# Patient Record
(demographics unavailable — no encounter records)

---

## 2025-02-05 NOTE — REVIEW OF SYSTEMS
[Fever] : fever [Earache] : no earache [Chest Pain] : no chest pain [Shortness Of Breath] : no shortness of breath [Hematuria] : no hematuria [Joint Pain] : no joint pain [Headache] : no headache

## 2025-02-05 NOTE — HISTORY OF PRESENT ILLNESS
[Cold Symptoms] : cold symptoms [Earache (L)] : pain in left ear [Earache (R)] : pain in right ear [Moderate] : moderate [___ Days ago] : [unfilled] days ago [Sudden] : suddenly [Constant] : constant [Congestion] : congestion [Cough] : cough [Chills] : chills [Anorexia] : anorexia [Earache] : earache [Fatigue] : fatigue [Headache] : headache [Fever] : fever [In Morning] : in the morning [Improving] : improving [Sore Throat] : no sore throat [Wheezing] : no wheezing [Shortness Of Breath] : no shortness of breath [de-identified] : Ears are in pain while blowing nose  [FreeTextEntry2] : Sinus Headache  [FreeTextEntry5] : N/A [FreeTextEntry8] : well until 4days ago when developed fever  and sinus congestion children have documented FLU patient now feels somewhat better

## 2025-02-05 NOTE — PHYSICAL EXAM
[No Acute Distress] : no acute distress [No JVD] : no jugular venous distention [No Respiratory Distress] : no respiratory distress  [Clear to Auscultation] : lungs were clear to auscultation bilaterally [Normal Rate] : normal rate  [Regular Rhythm] : with a regular rhythm [No Edema] : there was no peripheral edema [Soft] : abdomen soft [Non Tender] : non-tender [Normal Supraclavicular Nodes] : no supraclavicular lymphadenopathy [No CVA Tenderness] : no CVA  tenderness [No Rash] : no rash [No Focal Deficits] : no focal deficits [Alert and Oriented x3] : oriented to person, place, and time

## 2025-02-05 NOTE — HISTORY OF PRESENT ILLNESS
[Cold Symptoms] : cold symptoms [Earache (L)] : pain in left ear [Earache (R)] : pain in right ear [Moderate] : moderate [___ Days ago] : [unfilled] days ago [Sudden] : suddenly [Constant] : constant [Congestion] : congestion [Cough] : cough [Chills] : chills [Anorexia] : anorexia [Earache] : earache [Fatigue] : fatigue [Headache] : headache [Fever] : fever [In Morning] : in the morning [Improving] : improving [Sore Throat] : no sore throat [Wheezing] : no wheezing [Shortness Of Breath] : no shortness of breath [de-identified] : Ears are in pain while blowing nose  [FreeTextEntry2] : Sinus Headache  [FreeTextEntry5] : N/A [FreeTextEntry8] : well until 4days ago when developed fever  and sinus congestion children have documented FLU patient now feels somewhat better